# Patient Record
Sex: FEMALE | Race: BLACK OR AFRICAN AMERICAN | Employment: FULL TIME | URBAN - METROPOLITAN AREA
[De-identification: names, ages, dates, MRNs, and addresses within clinical notes are randomized per-mention and may not be internally consistent; named-entity substitution may affect disease eponyms.]

---

## 2020-05-12 ENCOUNTER — HOSPITAL ENCOUNTER (EMERGENCY)
Age: 30
Discharge: HOME | End: 2020-05-12
Attending: EMERGENCY MEDICINE
Payer: COMMERCIAL

## 2020-05-12 VITALS
DIASTOLIC BLOOD PRESSURE: 61 MMHG | TEMPERATURE: 98.3 F | WEIGHT: 139.77 LBS | RESPIRATION RATE: 19 BRPM | HEART RATE: 88 BPM | HEIGHT: 61 IN | OXYGEN SATURATION: 99 % | BODY MASS INDEX: 26.39 KG/M2 | SYSTOLIC BLOOD PRESSURE: 110 MMHG

## 2020-05-12 PROCEDURE — 99282 EMERGENCY DEPT VISIT SF MDM: CPT

## 2020-05-12 RX ORDER — PREDNISONE 10 MG/1
20 TABLET ORAL DAILY
Qty: 10 TABLET | Refills: 0 | Status: SHIPPED | OUTPATIENT
Start: 2020-05-12 | End: 2020-05-17

## 2020-05-12 RX ORDER — WATER / MINERAL OIL / WHITE PETROLATUM 16 OZ
CREAM TOPICAL
Qty: 1 PACKAGE | Refills: 0 | Status: SHIPPED | OUTPATIENT
Start: 2020-05-12

## 2020-05-12 RX ORDER — GRISEOFULVIN 500 MG/1
500 TABLET ORAL DAILY
Qty: 21 TABLET | Refills: 0 | Status: SHIPPED | OUTPATIENT
Start: 2020-05-12 | End: 2020-06-02

## 2020-05-12 NOTE — ED PROVIDER NOTES
Non-medical: Not on file   Tobacco Use    Smoking status: Never Smoker    Smokeless tobacco: Never Used   Substance and Sexual Activity    Alcohol use: Not on file    Drug use: Not on file    Sexual activity: Not on file   Lifestyle    Physical activity     Days per week: Not on file     Minutes per session: Not on file    Stress: Not on file   Relationships    Social connections     Talks on phone: Not on file     Gets together: Not on file     Attends [employer] service: Not on file     Active member of club or organization: Not on file     Attends meetings of clubs or organizations: Not on file     Relationship status: Not on file    Intimate partner violence     Fear of current or ex partner: Not on file     Emotionally abused: Not on file     Physically abused: Not on file     Forced sexual activity: Not on file   Other Topics Concern    Not on file   Social History Narrative    Not on file     No current facility-administered medications for this encounter. Current Outpatient Medications   Medication Sig Dispense Refill    griseofulvin (GRIFULVIN V) 500 MG tablet Take 1 tablet by mouth daily for 21 days 21 tablet 0    predniSONE (DELTASONE) 10 MG tablet Take 2 tablets by mouth daily for 5 doses 10 tablet 0    Skin Protectants, Misc. (EUCERIN) cream Apply topically as needed.  1 Package 0     No Known Allergies      REVIEW OF SYSTEMS  10 systems reviewed, pertinent positives per HPI otherwise noted to be negative    PHYSICAL EXAM  /61   Pulse 88   Temp 98.3 °F (36.8 °C) (Oral)   Resp 19   Ht 5' 1\" (1.549 m)   Wt 139 lb 12.4 oz (63.4 kg)   SpO2 99%   BMI 26.41 kg/m²      CONSTITUTIONAL: AOx4, cooperative with exam, afebrile   HEAD: normocephalic, atraumatic   EYES: PERRL, EOMI, anicteric sclera   ENT: Moist mucous membranes, uvula midline   LUNGS: Bilateral breath sounds, CTAB, no rales/ronchi/wheezes   CARDIOVASCULAR: RRR, normal S1/S2, no m/r/g, 2+ pulses throughout   ABDOMEN: counseled patient how to take these medications. New Prescriptions    GRISEOFULVIN (GRIFULVIN V) 500 MG TABLET    Take 1 tablet by mouth daily for 21 days    PREDNISONE (DELTASONE) 10 MG TABLET    Take 2 tablets by mouth daily for 5 doses    SKIN PROTECTANTS, MISC. (EUCERIN) CREAM    Apply topically as needed. CLINICAL IMPRESSION  1. Tinea corporis        Blood pressure 110/61, pulse 88, temperature 98.3 °F (36.8 °C), temperature source Oral, resp. rate 19, height 5' 1\" (1.549 m), weight 139 lb 12.4 oz (63.4 kg), SpO2 99 %. DISPOSITION  Patient was discharged to home in good condition. [facility]  [phone]  Call today  For a follow up appointment. [facility]  [phone]  Call today  For a follow up appointment. Disclaimer: All medical record entries made by Astrid dictation.       (Please note that this note was completed with a voice recognition program. Every attempt was made to edit the dictations, but inevitably there remain words that are mis-transcribed.)            Romeo Olivarez MD  05/12/20 [id number]

## 2020-05-12 NOTE — ED NOTES
Awake alert  Given referral to dermatology  To return for worsening or changes  Walked out with ease.  Aware how an why to use meds     Elena Kerr RN  05/12/20 4736

## 2020-11-09 ENCOUNTER — HOSPITAL ENCOUNTER (EMERGENCY)
Age: 30
Discharge: HOME | End: 2020-11-09
Payer: COMMERCIAL

## 2020-11-09 VITALS
OXYGEN SATURATION: 98 % | RESPIRATION RATE: 16 BRPM | SYSTOLIC BLOOD PRESSURE: 107 MMHG | HEIGHT: 61 IN | DIASTOLIC BLOOD PRESSURE: 61 MMHG | HEART RATE: 84 BPM | WEIGHT: 140.65 LBS | BODY MASS INDEX: 26.56 KG/M2 | TEMPERATURE: 97.2 F

## 2020-11-09 PROCEDURE — 99284 EMERGENCY DEPT VISIT MOD MDM: CPT

## 2020-11-09 RX ORDER — KETOCONAZOLE 20 MG/ML
SHAMPOO TOPICAL
Qty: 1 BOTTLE | Refills: 0 | Status: SHIPPED | OUTPATIENT
Start: 2020-11-09

## 2020-11-09 RX ORDER — KETOCONAZOLE 20 MG/G
CREAM TOPICAL
Qty: 30 G | Refills: 1 | Status: SHIPPED | OUTPATIENT
Start: 2020-11-09

## 2020-11-09 SDOH — HEALTH STABILITY: MENTAL HEALTH: HOW OFTEN DO YOU HAVE A DRINK CONTAINING ALCOHOL?: NEVER

## 2020-11-09 ASSESSMENT — PAIN SCALES - GENERAL
PAINLEVEL_OUTOF10: 0
PAINLEVEL_OUTOF10: 0
PAINLEVEL_OUTOF10: 8

## 2020-11-09 ASSESSMENT — ENCOUNTER SYMPTOMS
VOMITING: 0
DIARRHEA: 0
SHORTNESS OF BREATH: 0
ABDOMINAL PAIN: 0
BACK PAIN: 0
EYE PAIN: 0
COUGH: 0
NAUSEA: 0

## 2020-11-09 ASSESSMENT — PAIN DESCRIPTION - DESCRIPTORS: DESCRIPTORS: DISCOMFORT

## 2020-11-09 ASSESSMENT — PAIN DESCRIPTION - LOCATION: LOCATION: FACE

## 2020-11-09 ASSESSMENT — PAIN DESCRIPTION - PAIN TYPE: TYPE: ACUTE PAIN

## 2020-11-09 NOTE — ED PROVIDER NOTES
[address] [facility]        Pt Name: Danny Rangel  MRN: [id number]  [place] 1990  Date of evaluation: 11/9/2020  Provider: ANNA Hall  PCP: No primary care provider on file. JAE. I have evaluated this patient. My supervising physician was available for consultation. CHIEF COMPLAINT       Chief Complaint   Patient presents with    Pruritis     and swelling. states she has a fungal infection and ran out of meds. sx started 2 days ago. HISTORY OF PRESENT ILLNESS   (Location, Timing/Onset, Context/Setting, Quality, Duration, Modifying Factors, Severity, Associated Signs and Symptoms)  Note limiting factors. Danny Rangel is a 31 y.o. female who presents to the emergency department for chronic pruritic rash. Onset over the last 2 years. Located on her scalp, right cheek, bilateral upper arms. Has been seen for this at multiple emergency departments in the past, states only thing that helped was a ketoconazole ointment. She states she is tried following up with dermatology but has been told that she requires a referral.  She denies any pain. The areas are exquisitely itchy. She has never had a biopsy. No fevers, chills. Nursing Notes were all reviewed and agreed with or any disagreements were addressed in the HPI. REVIEW OF SYSTEMS    (2-9 systems for level 4, 10 or more for level 5)     Review of Systems   Constitutional: Negative for chills, fatigue and fever. Eyes: Negative for pain. Respiratory: Negative for cough and shortness of breath. Cardiovascular: Negative for chest pain. Gastrointestinal: Negative for abdominal pain, diarrhea, nausea and vomiting. Genitourinary: Negative for dysuria. Musculoskeletal: Negative for back pain, neck pain and neck stiffness. Skin: Positive for rash and wound. Neurological: Negative for dizziness and headaches.    Psychiatric/Behavioral: Negative for confusion. Positives and Pertinent negatives as per HPI. Except as noted above in the ROS, all other systems were reviewed and negative. PAST MEDICAL HISTORY     Past Medical History:   Diagnosis Date    Asthma          SURGICAL HISTORY     Past Surgical History:   Procedure Laterality Date    TUBAL LIGATION           CURRENTMEDICATIONS       Previous Medications    SKIN PROTECTANTS, MISC. (EUCERIN) CREAM    Apply topically as needed. ALLERGIES     Patient has no known allergies. FAMILYHISTORY     No family history on file. SOCIAL HISTORY       Social History     Tobacco Use    Smoking status: Never Smoker    Smokeless tobacco: Never Used   Substance Use Topics    Alcohol use: Not on file    Drug use: Not on file       SCREENINGS             PHYSICAL EXAM    (up to 7 for level 4, 8 or more for level 5)     ED Triage Vitals [11/09/20 1136]   BP Temp Temp Source Pulse Resp SpO2 Height Weight   107/61 97.2 °F (36.2 °C) Oral 84 16 98 % 5' 1\" (1.549 m) 140 lb 10.5 oz (63.8 kg)       Physical Exam  Vitals signs and nursing note reviewed. Constitutional:       General: She is not in acute distress. Appearance: She is well-developed. She is not diaphoretic. HENT:      Head: Normocephalic and atraumatic. Eyes:      General:         Right eye: No discharge. Left eye: No discharge. Neck:      Musculoskeletal: Normal range of motion and neck supple. Pulmonary:      Effort: No respiratory distress. Breath sounds: No stridor. Musculoskeletal: Normal range of motion. Skin:     General: Skin is warm and dry. Coloration: Skin is not pale. Neurological:      Mental Status: She is alert and oriented to person, place, and time. Comments: No gross facial drooping. Moves all 4 extremities spontaneously.    Psychiatric:         Behavior: Behavior normal.                         DIAGNOSTIC RESULTS   LABS:    Labs Reviewed - No data to display    All other labs were within normal range or not returned as of this dictation. EKG: All EKG's are interpreted by the Emergency Department Physician in the absence of a cardiologist.  Please see their note for interpretation of EKG. RADIOLOGY:   Non-plain film images such as CT, Ultrasound and MRI are read by the radiologist. Plain radiographic images are visualized and preliminarily interpreted by the ED Provider with the below findings:        Interpretation per the Radiologist below, if available at the time of this note:    No orders to display     No results found. PROCEDURES   Unless otherwise noted below, none     Procedures    CRITICAL CARE TIME   N/A    CONSULTS:  None      EMERGENCY DEPARTMENT COURSE and DIFFERENTIAL DIAGNOSIS/MDM:   Vitals:    Vitals:    11/09/20 1136   BP: 107/61   Pulse: 84   Resp: 16   Temp: 97.2 °F (36.2 °C)   TempSrc: Oral   SpO2: 98%   Weight: 140 lb 10.5 oz (63.8 kg)   Height: 5' 1\" (1.549 m)       Patient was given the following medications:  Medications - No data to display        DDX: Vasculitis, bacterial skin infection, viral rash, systemic infectious rash, Anaphylaxis, Urticaria. Patient seen and evaluated as detailed above for chronic pruritic rashes as pictured above to scalp, bilateral upper arms, right cheek. Patient is afebrile, in no acute distress, and nontoxic in appearance with unremarkable vital signs. No mucous membrane involvement with low suspicion for SJS/TEN. No wheezing or difficulty breathing with low suspicion for systemic involvement. Differential includes tinea, psoriasis  Pt states she had relief with ketoconazole in the past, I will prescribe this and refer to dermatology  At this time I believe patient's presentation does not warrant further workup with labs or imaging in the emergency department and is stable for discharge home. Discussed close monitoring for progression.  Patient will be discharged with instructions to follow up with the primary care physician for reevaluation in the next few days, and to return to the emergency department for any worsening symptoms or further concerns. They verbalized understanding and were discharged in stable condition. Tracey Simons is well appearing, non-toxic, and afebrile at the time of discharge. FINAL IMPRESSION      1. Chronic pruritic rash in adult          DISPOSITION/PLAN   DISPOSITION Discharge - Pending Orders Complete 11/09/2020 03:21:06 PM      PATIENT REFERREDTO:  [facility]  [address] [facility]. [address] [place] [address] [address]  [phone]    ED follow up with dermatology      DISCHARGE MEDICATIONS:  New Prescriptions    KETOCONAZOLE (NIZORAL) 2 % CREAM    Apply topically daily. KETOCONAZOLE (NIZORAL) 2 % SHAMPOO    Apply topically daily to scalp as needed.        DISCONTINUED MEDICATIONS:  Discontinued Medications    No medications on file              (Please note that portions of this note were completed with a voice recognition program.  Efforts were made to edit the dictations but occasionally words are mis-transcribed.)    ANNA Barragan (electronically signed)            ANNA Barragan  11/09/20 [address] [place], [place]  11/09/20 5604